# Patient Record
Sex: FEMALE | Race: BLACK OR AFRICAN AMERICAN | NOT HISPANIC OR LATINO | Employment: UNEMPLOYED | URBAN - METROPOLITAN AREA
[De-identification: names, ages, dates, MRNs, and addresses within clinical notes are randomized per-mention and may not be internally consistent; named-entity substitution may affect disease eponyms.]

---

## 2024-09-18 ENCOUNTER — TELEPHONE (OUTPATIENT)
Dept: OPHTHALMOLOGY | Facility: CLINIC | Age: 69
End: 2024-09-18
Payer: MEDICARE

## 2024-09-18 NOTE — TELEPHONE ENCOUNTER
Called patient at 3:02, patient stated never mind to not worry about it. I did inform patient she would have to sign records release with them due to HIPPA      ----- Message from Xi Orvi sent at 9/18/2024  2:30 PM CDT -----  Contact: Pt  [phone]  Would like to receive medical advice.    Would they like a call back or a response via [facility]:  call back     Additional information:  Pt said she spoke to nurse about having her medical records sent over.  She said the office has to send a request to Dr. Curtis. The medical records will be under Kerri Wheeler.  She recently changed her last name.  Please call to advise.